# Patient Record
Sex: FEMALE | Race: WHITE
[De-identification: names, ages, dates, MRNs, and addresses within clinical notes are randomized per-mention and may not be internally consistent; named-entity substitution may affect disease eponyms.]

---

## 2017-12-05 ENCOUNTER — HOSPITAL ENCOUNTER (OUTPATIENT)
Dept: HOSPITAL 92 - LABBT | Age: 47
Discharge: HOME | End: 2017-12-05
Attending: ORTHOPAEDIC SURGERY
Payer: COMMERCIAL

## 2017-12-05 DIAGNOSIS — Z01.812: Primary | ICD-10-CM

## 2017-12-05 DIAGNOSIS — S83.242D: ICD-10-CM

## 2017-12-05 LAB
ANION GAP SERPL CALC-SCNC: 9 MMOL/L (ref 10–20)
BASOPHILS # BLD AUTO: 0.1 THOU/UL (ref 0–0.2)
BASOPHILS NFR BLD AUTO: 0.7 % (ref 0–1)
BUN SERPL-MCNC: 12 MG/DL (ref 7–18.7)
CALCIUM SERPL-MCNC: 10.1 MG/DL (ref 7.8–10.44)
CHLORIDE SERPL-SCNC: 104 MMOL/L (ref 98–107)
CO2 SERPL-SCNC: 30 MMOL/L (ref 22–29)
CREAT CL PREDICTED SERPL C-G-VRATE: 0 ML/MIN (ref 70–130)
EOSINOPHIL # BLD AUTO: 0.1 THOU/UL (ref 0–0.7)
EOSINOPHIL NFR BLD AUTO: 1.2 % (ref 0–10)
HCT VFR BLD CALC: 41.5 % (ref 36–47)
LYMPHOCYTES # BLD: 1.7 THOU/UL (ref 1.2–3.4)
LYMPHOCYTES NFR BLD AUTO: 22.2 % (ref 21–51)
MONOCYTES # BLD AUTO: 0.5 THOU/UL (ref 0.11–0.59)
MONOCYTES NFR BLD AUTO: 6.6 % (ref 0–10)
NEUTROPHILS # BLD AUTO: 5.4 THOU/UL (ref 1.4–6.5)
RBC # BLD AUTO: 4.24 MILL/UL (ref 4.2–5.4)
WBC # BLD AUTO: 7.7 THOU/UL (ref 4.8–10.8)

## 2017-12-05 PROCEDURE — 85025 COMPLETE CBC W/AUTO DIFF WBC: CPT

## 2017-12-05 PROCEDURE — 93010 ELECTROCARDIOGRAM REPORT: CPT

## 2017-12-05 PROCEDURE — 93005 ELECTROCARDIOGRAM TRACING: CPT

## 2017-12-05 PROCEDURE — 80048 BASIC METABOLIC PNL TOTAL CA: CPT

## 2017-12-05 NOTE — EKG
Test Reason : 

Blood Pressure : ***/*** mmHG

Vent. Rate : 054 BPM     Atrial Rate : 054 BPM

   P-R Int : 206 ms          QRS Dur : 078 ms

    QT Int : 388 ms       P-R-T Axes : 067 054 068 degrees

   QTc Int : 367 ms

 

Sinus bradycardia

Otherwise normal ECG

When compared with ECG of 13-MAR-2012 10:52,

No significant change was found

Confirmed by YUMIKO VICTORIA ([id number]) on 12/5/2017 1:17:53 PM

 

Referred By:  SHEMAR           Confirmed By:YUMIKO VICTORIA

## 2017-12-07 ENCOUNTER — HOSPITAL ENCOUNTER (OUTPATIENT)
Dept: HOSPITAL 92 - SDC | Age: 47
Discharge: HOME | End: 2017-12-07
Attending: ORTHOPAEDIC SURGERY
Payer: COMMERCIAL

## 2017-12-07 VITALS — BODY MASS INDEX: 24.5 KG/M2

## 2017-12-07 DIAGNOSIS — M22.42: ICD-10-CM

## 2017-12-07 DIAGNOSIS — J45.909: ICD-10-CM

## 2017-12-07 DIAGNOSIS — Z98.51: ICD-10-CM

## 2017-12-07 DIAGNOSIS — Z98.890: ICD-10-CM

## 2017-12-07 DIAGNOSIS — I34.1: ICD-10-CM

## 2017-12-07 DIAGNOSIS — S83.242A: Primary | ICD-10-CM

## 2017-12-07 DIAGNOSIS — Z79.899: ICD-10-CM

## 2017-12-07 DIAGNOSIS — F41.9: ICD-10-CM

## 2017-12-07 DIAGNOSIS — F32.9: ICD-10-CM

## 2017-12-07 DIAGNOSIS — Z98.1: ICD-10-CM

## 2017-12-07 PROCEDURE — 0SBC4ZZ EXCISION OF RIGHT KNEE JOINT, PERCUTANEOUS ENDOSCOPIC APPROACH: ICD-10-PCS | Performed by: ORTHOPAEDIC SURGERY

## 2017-12-07 NOTE — OP
PREOPERATIVE DIAGNOSIS:  Medial meniscus tear.

 

POSTOPERATIVE DIAGNOSIS:  Medial meniscus tear.

 

FINDINGS AT SURGERY:  Medial meniscus tear, intact ACL, no significant arthritis other than mild trish
dromalacia of patella.

 

DESCRIPTION OF PROCEDURE:  The patient was taken to the operating room where general anesthesia was i
nduced.  Left leg was prepped and draped in the usual sterile fashion.  She received Ancef preoperati
vely.  Scope was placed in the lateral portal and probe was placed medial portal.  Findings were as a
shania.  She had a large flap tear of the medial meniscus which was extruded.  I debrided using basket 
forceps and removed the piece in toto and then debrided using a 4-0 full radius resector.  This was t
hen probed and found to be stable.  Knee was drained.

## 2018-07-30 ENCOUNTER — HOSPITAL ENCOUNTER (EMERGENCY)
Dept: HOSPITAL 92 - ERS | Age: 48
Discharge: HOME | End: 2018-07-30
Payer: COMMERCIAL

## 2018-07-30 DIAGNOSIS — N13.2: Primary | ICD-10-CM

## 2018-07-30 LAB
ALBUMIN SERPL BCG-MCNC: 4.6 G/DL (ref 3.5–5)
ALP SERPL-CCNC: 64 U/L (ref 40–150)
ALT SERPL W P-5'-P-CCNC: 20 U/L (ref 8–55)
ANION GAP SERPL CALC-SCNC: 12 MMOL/L (ref 10–20)
AST SERPL-CCNC: 19 U/L (ref 5–34)
BASOPHILS # BLD AUTO: 0 THOU/UL (ref 0–0.2)
BASOPHILS NFR BLD AUTO: 0.6 % (ref 0–1)
BILIRUB SERPL-MCNC: 0.6 MG/DL (ref 0.2–1.2)
BUN SERPL-MCNC: 13 MG/DL (ref 7–18.7)
CALCIUM SERPL-MCNC: 9.8 MG/DL (ref 7.8–10.44)
CHLORIDE SERPL-SCNC: 106 MMOL/L (ref 98–107)
CO2 SERPL-SCNC: 25 MMOL/L (ref 22–29)
CREAT CL PREDICTED SERPL C-G-VRATE: 0 ML/MIN (ref 70–130)
EOSINOPHIL # BLD AUTO: 0.1 THOU/UL (ref 0–0.7)
EOSINOPHIL NFR BLD AUTO: 1.7 % (ref 0–10)
GLOBULIN SER CALC-MCNC: 2.6 G/DL (ref 2.4–3.5)
GLUCOSE SERPL-MCNC: 140 MG/DL (ref 70–105)
HGB BLD-MCNC: 13.6 G/DL (ref 12–16)
LIPASE SERPL-CCNC: 21 U/L (ref 8–78)
LYMPHOCYTES # BLD: 2.4 THOU/UL (ref 1.2–3.4)
LYMPHOCYTES NFR BLD AUTO: 42.7 % (ref 21–51)
MCH RBC QN AUTO: 32.2 PG (ref 27–31)
MCV RBC AUTO: 94.4 FL (ref 78–98)
MONOCYTES # BLD AUTO: 0.6 THOU/UL (ref 0.11–0.59)
MONOCYTES NFR BLD AUTO: 10.2 % (ref 0–10)
NEUTROPHILS # BLD AUTO: 2.5 THOU/UL (ref 1.4–6.5)
NEUTROPHILS NFR BLD AUTO: 44.8 % (ref 42–75)
PLATELET # BLD AUTO: 244 THOU/UL (ref 130–400)
POTASSIUM SERPL-SCNC: 3.7 MMOL/L (ref 3.5–5.1)
PREGS CONTROL BACKGROUND?: (no result)
PREGS CONTROL BAR APPEAR?: YES
RBC # BLD AUTO: 4.21 MILL/UL (ref 4.2–5.4)
SODIUM SERPL-SCNC: 139 MMOL/L (ref 136–145)
WBC # BLD AUTO: 5.7 THOU/UL (ref 4.8–10.8)

## 2018-07-30 PROCEDURE — 96374 THER/PROPH/DIAG INJ IV PUSH: CPT

## 2018-07-30 PROCEDURE — 96375 TX/PRO/DX INJ NEW DRUG ADDON: CPT

## 2018-07-30 PROCEDURE — 74176 CT ABD & PELVIS W/O CONTRAST: CPT

## 2018-07-30 PROCEDURE — 96361 HYDRATE IV INFUSION ADD-ON: CPT

## 2018-07-30 PROCEDURE — 85025 COMPLETE CBC W/AUTO DIFF WBC: CPT

## 2018-07-30 PROCEDURE — 96376 TX/PRO/DX INJ SAME DRUG ADON: CPT

## 2018-07-30 PROCEDURE — 84703 CHORIONIC GONADOTROPIN ASSAY: CPT

## 2018-07-30 PROCEDURE — 80053 COMPREHEN METABOLIC PANEL: CPT

## 2018-07-30 PROCEDURE — 83690 ASSAY OF LIPASE: CPT

## 2018-07-30 NOTE — CT
CT ABDOMEN AND PELVIS WITHOUT CONTRAST STONE PROTOCOL:

 

Date:  07/30/18 

 

HISTORY:  

Right upper quadrant pain. 

 

FINDINGS:

Lung bases are clear. No pericardial effusion. 

 

There is moderate right-sided hydroureteronephrosis due to a distal partially obstructive 3.0 x 3.0 m
m calculus at the ureterovesical junction. No other calculus is seen within the right renal collectin
g system. No calculus in the left renal collecting system. No left-sided hydroureteronephrosis or nep
hroureterolithiasis. 

 

Numerous phleboliths in the pelvis. Posterior spinal fusion hardware L4-5 is intact. 

 

Moderate diverticular disease of the sigmoid colon without active inflammation. 

 

Aortoiliac contour is nonaneurysmal. The noncontrast portion of the spleen, liver, and gallbladder ar
e unremarkable. Small sliding hiatal hernia. 

 

IMPRESSION: 

1.  Mild to moderate right-sided hydroureteronephrosis due to a partially obstructive calculus at the
 ureterovesical junction measuring approximately 3.0 x 3.0 mm. No significant right-sided perinephric
 stranding. 

 

2.  Moderate to severe diverticular disease of the sigmoid colon without active or current inflammati
on. There is a long diverticulum abutting the left external iliac vessels. 

 

 

 

POS: [facility]

## 2018-08-22 ENCOUNTER — HOSPITAL ENCOUNTER (OUTPATIENT)
Dept: HOSPITAL 92 - MAMMO | Age: 48
End: 2018-08-22
Attending: OBSTETRICS & GYNECOLOGY
Payer: COMMERCIAL

## 2018-08-22 DIAGNOSIS — D24.2: Primary | ICD-10-CM

## 2018-08-22 PROCEDURE — 0HBU3ZX EXCISION OF LEFT BREAST, PERCUTANEOUS APPROACH, DIAGNOSTIC: ICD-10-PCS | Performed by: RADIOLOGY

## 2018-08-22 PROCEDURE — 89060 EXAM SYNOVIAL FLUID CRYSTALS: CPT

## 2018-08-22 PROCEDURE — 88305 TISSUE EXAM BY PATHOLOGIST: CPT

## 2018-08-22 PROCEDURE — 19081 BX BREAST 1ST LESION STRTCTC: CPT

## 2018-08-22 PROCEDURE — 88342 IMHCHEM/IMCYTCHM 1ST ANTB: CPT

## 2018-08-22 PROCEDURE — 88341 IMHCHEM/IMCYTCHM EA ADD ANTB: CPT

## 2018-08-22 PROCEDURE — 76098 X-RAY EXAM SURGICAL SPECIMEN: CPT

## 2018-08-22 NOTE — MMO
LEFT BREAST SPECIMEN MAMMOGRAM:

 

Date: 8-22-18 

 

FINDINGS: 

As reported on stereotactic guided left breast biopsy report, multiple obtained specimens demonstrate
 microcalcifications within the provided specimen. Pathology is currently pending. 

 

IMPRESSION: 

Specimen mammogram demonstrating microcalcifications within the specimen. 

 

POS: [facility]